# Patient Record
(demographics unavailable — no encounter records)

---

## 2025-04-11 NOTE — PHYSICAL EXAM
[Well Developed] : well developed [Well Nourished] : well nourished [No Acute Distress] : no acute distress [Normal Conjunctiva] : normal conjunctiva [Normal Venous Pressure] : normal venous pressure [No Carotid Bruit] : no carotid bruit [Normal S1, S2] : normal S1, S2 [No Rub] : no rub [No Gallop] : no gallop [Clear Lung Fields] : clear lung fields [Good Air Entry] : good air entry [No Respiratory Distress] : no respiratory distress  [Soft] : abdomen soft [Non Tender] : non-tender [No Masses/organomegaly] : no masses/organomegaly [Normal Bowel Sounds] : normal bowel sounds [Normal Gait] : normal gait [No Edema] : no edema [No Cyanosis] : no cyanosis [No Clubbing] : no clubbing [No Varicosities] : no varicosities [No Rash] : no rash [No Skin Lesions] : no skin lesions [Moves all extremities] : moves all extremities [No Focal Deficits] : no focal deficits [Normal Speech] : normal speech [Alert and Oriented] : alert and oriented [Normal memory] : normal memory [de-identified] : + sm at ru/maryb

## 2025-04-11 NOTE — HISTORY OF PRESENT ILLNESS
[FreeTextEntry1] : Geena is a 66-year-old female here for follow up.  Her past medical history is notable for cervical cancer, status post radical hysterectomy at age 39.  She is also status post radiation at this time.  She has a history of hyperlipidemia, on Lipitor 20.  Her LDL in 2022 was 123. Her LDL improved to the 90's with STATIN therapy She is a smoker, 1/2 PPD.  She was found to have an abnormal EKG at her primary doctor's office, and was referred here for evaluation.    Her family history is notable for a fatal thromboembolism in her father, and open heart surgery in her mother in her late 70s. She is a current smoker, smoking about a half a pack per day.  She did quit at a point in time, restarted in the setting of heart break.  Because of an abnormal EKG, I set her up for an echocardiogram which demonstrated normal left ventricular systolic function, without significant valvular pathology.  She had a coronary CTA, which demonstrated a calcium score of 30.  There was mild disease of the mid LAD and mid left circumflex,  now on atorvastatin 80mg daily  Overall, she is feeling well. She has no chest pain, difficulty breathing or palpitations. She has not been sleeping, taking tylenol PM Does about 10,000 steps perday.  Latest LDL is 81.   She does eat 2 eggs per day

## 2025-04-11 NOTE — CARDIOLOGY SUMMARY
[de-identified] : 5/26/22: sr 4/2025: sinus rhythm RSR [de-identified] : 4/2024: LVEF 63%, mild MR, trace TR [de-identified] : CTCA 8/2022: calcium score of 30, non calcified plaque mLAD (mild stenosis 40-45%) [de-identified] : 4/2024: mild athero

## 2025-04-11 NOTE — CARDIOLOGY SUMMARY
[de-identified] : 5/26/22: sr 4/2025: sinus rhythm RSR [de-identified] : 4/2024: LVEF 63%, mild MR, trace TR [de-identified] : CTCA 8/2022: calcium score of 30, non calcified plaque mLAD (mild stenosis 40-45%) [de-identified] : 4/2024: mild athero

## 2025-04-11 NOTE — DISCUSSION/SUMMARY
[FreeTextEntry1] : Geena is a 66-year-old female here for follow up evaluation. Her blood pressure is at goal. She has a known systolic murmur on exam.  Her EKG demonstrates a sinus rhythm, Rsr.  She is a bit sedentary, puts in steps mostly for work.  She has mild CAD on CTA 2022 and is still smoking. Have advised she undergo nuclear stress test to rule out evidence of new or worsening CAD. Her latest LDLc is 81, ideally would prefer LDLc 70 or less. She will continue lipitor 80 for now. We will repeat BW in 3, in the mean time, she will cut back on number of eggs per day. If LDL is still elevated, will likely add zetia to her regimen.  Echocardiogram demonstrates nml LV function, mild MR, trace TR, There is mild calcification of the aortic valve leaflets.  I have stressed the importance of diet, exercise, weight loss, reduce her overall cardiovascular risk.  We also discussed the importance of smoking cessation.  We will be in touch regarding her B/W results and stress test.  If no issues, I will see her again in 6 months. [EKG obtained to assist in diagnosis and management of assessed problem(s)] : EKG obtained to assist in diagnosis and management of assessed problem(s)

## 2025-04-11 NOTE — PHYSICAL EXAM
[Well Developed] : well developed [Well Nourished] : well nourished [No Acute Distress] : no acute distress [Normal Conjunctiva] : normal conjunctiva [Normal Venous Pressure] : normal venous pressure [No Carotid Bruit] : no carotid bruit [Normal S1, S2] : normal S1, S2 [No Rub] : no rub [No Gallop] : no gallop [Clear Lung Fields] : clear lung fields [Good Air Entry] : good air entry [No Respiratory Distress] : no respiratory distress  [Soft] : abdomen soft [Non Tender] : non-tender [No Masses/organomegaly] : no masses/organomegaly [Normal Bowel Sounds] : normal bowel sounds [Normal Gait] : normal gait [No Edema] : no edema [No Cyanosis] : no cyanosis [No Clubbing] : no clubbing [No Varicosities] : no varicosities [No Rash] : no rash [No Skin Lesions] : no skin lesions [Moves all extremities] : moves all extremities [No Focal Deficits] : no focal deficits [Normal Speech] : normal speech [Alert and Oriented] : alert and oriented [Normal memory] : normal memory [de-identified] : + sm at ru/maryb